# Patient Record
Sex: FEMALE | Race: WHITE | NOT HISPANIC OR LATINO
[De-identification: names, ages, dates, MRNs, and addresses within clinical notes are randomized per-mention and may not be internally consistent; named-entity substitution may affect disease eponyms.]

---

## 2021-08-04 PROBLEM — Z00.00 ENCOUNTER FOR PREVENTIVE HEALTH EXAMINATION: Status: ACTIVE | Noted: 2021-08-04

## 2021-08-09 ENCOUNTER — APPOINTMENT (OUTPATIENT)
Dept: THORACIC SURGERY | Facility: CLINIC | Age: 66
End: 2021-08-09
Payer: MEDICARE

## 2021-08-09 VITALS
HEART RATE: 87 BPM | DIASTOLIC BLOOD PRESSURE: 85 MMHG | RESPIRATION RATE: 18 BRPM | SYSTOLIC BLOOD PRESSURE: 140 MMHG | TEMPERATURE: 98.1 F | OXYGEN SATURATION: 97 %

## 2021-08-09 DIAGNOSIS — Z86.39 PERSONAL HISTORY OF OTHER ENDOCRINE, NUTRITIONAL AND METABOLIC DISEASE: ICD-10-CM

## 2021-08-09 DIAGNOSIS — Z85.51 PERSONAL HISTORY OF MALIGNANT NEOPLASM OF BLADDER: ICD-10-CM

## 2021-08-09 DIAGNOSIS — Z85.3 PERSONAL HISTORY OF MALIGNANT NEOPLASM OF BREAST: ICD-10-CM

## 2021-08-09 DIAGNOSIS — Z87.09 PERSONAL HISTORY OF OTHER DISEASES OF THE RESPIRATORY SYSTEM: ICD-10-CM

## 2021-08-09 DIAGNOSIS — Z87.891 PERSONAL HISTORY OF NICOTINE DEPENDENCE: ICD-10-CM

## 2021-08-09 PROCEDURE — 99203 OFFICE O/P NEW LOW 30 MIN: CPT

## 2021-08-09 RX ORDER — LEVOTHYROXINE SODIUM 88 UG/1
88 TABLET ORAL
Refills: 0 | Status: ACTIVE | COMMUNITY

## 2021-08-09 RX ORDER — MIRTAZAPINE 7.5 MG/1
TABLET, FILM COATED ORAL
Refills: 0 | Status: ACTIVE | COMMUNITY

## 2021-08-09 RX ORDER — UBIDECARENONE/VIT E ACET 100MG-5
CAPSULE ORAL
Refills: 0 | Status: ACTIVE | COMMUNITY

## 2021-08-09 RX ORDER — ALBUTEROL SULFATE 90 UG/1
108 (90 BASE) AEROSOL, METERED RESPIRATORY (INHALATION)
Refills: 0 | Status: ACTIVE | COMMUNITY

## 2021-08-09 RX ORDER — GABAPENTIN 100 MG/1
CAPSULE ORAL
Refills: 0 | Status: ACTIVE | COMMUNITY

## 2021-08-09 RX ORDER — FLUTICASONE PROPION/SALMETEROL 500-50 MCG
BLISTER, WITH INHALATION DEVICE INHALATION
Refills: 0 | Status: ACTIVE | COMMUNITY

## 2021-08-09 RX ORDER — FLUTICASONE PROPIONATE 50 MCG
50 SPRAY, SUSPENSION NASAL
Refills: 0 | Status: ACTIVE | COMMUNITY

## 2021-08-09 RX ORDER — DIPHENOXYLATE HYDROCHLORIDE AND ATROPINE SULFATE 2.5; .025 MG/1; MG/1
TABLET ORAL
Refills: 0 | Status: ACTIVE | COMMUNITY

## 2021-08-09 RX ORDER — DICYCLOMINE HYDROCHLORIDE 10 MG/1
10 CAPSULE ORAL
Refills: 0 | Status: ACTIVE | COMMUNITY

## 2021-08-09 RX ORDER — DEXTRIN 3 G/3.5 G
POWDER (GRAM) ORAL
Refills: 0 | Status: ACTIVE | COMMUNITY

## 2021-08-09 RX ORDER — FAMOTIDINE 10 MG/1
TABLET, FILM COATED ORAL
Refills: 0 | Status: ACTIVE | COMMUNITY

## 2021-08-09 RX ORDER — FOLIC ACID 20 MG
CAPSULE ORAL
Refills: 0 | Status: ACTIVE | COMMUNITY

## 2021-08-09 RX ORDER — ALPRAZOLAM 2 MG/1
TABLET ORAL
Refills: 0 | Status: ACTIVE | COMMUNITY

## 2021-08-24 NOTE — REASON FOR VISIT
[Initial Evaluation] : an initial evaluation [Family Member] : family member [FreeTextEntry1] : History of lung cancer new lung nodules

## 2021-08-24 NOTE — HISTORY OF PRESENT ILLNESS
[FreeTextEntry1] : Kristin is a 66-year-old female with a history of lung cancer and breast cancer in the past.  The patient has been followed by oncology on a regular basis.  The patient complains of severe pain.  At the patient had several CAT scans done including a CAT scan of the chest.  The CT scan of the chest showed a 5 mm nodule in the right upper lobe that was new as well as a for me millimeter nodule in the right upper lobe that was also new.  No evidence of any other disease was seen.  There was no lymphadenopathy.  The patient's main complaint is back pain.  She is here for evaluation.

## 2021-08-24 NOTE — ASSESSMENT
[FreeTextEntry1] : This patient is a 66-year-old female with a history of lung cancer in the past.  The patient has 2 new lung nodules in the right upper lobe.  These are new.  They are 5 mm and 4 mm respectively.  No other abnormalities.  There is no other evidence of malignancy.  No evidence of mediastinal or hilar adenopathy.  The patient's main complaint is back pain.  I had a long conversation with the patient.  I recommend a repeat CAT scan in 6months.  At this point the nodules are too small for PET scan or needle biopsy.  I would not consider surgery at this time.  The patient should get a repeat CAT scan in 6months, if there is any growth then we will consider more aggressive options at that time.

## 2022-01-07 DIAGNOSIS — R91.1 SOLITARY PULMONARY NODULE: ICD-10-CM

## 2022-02-28 ENCOUNTER — APPOINTMENT (OUTPATIENT)
Dept: THORACIC SURGERY | Facility: CLINIC | Age: 67
End: 2022-02-28
Payer: MEDICARE

## 2022-02-28 PROCEDURE — 99442: CPT | Mod: 95

## 2022-03-03 NOTE — ASSESSMENT
[FreeTextEntry1] : This patient is a 66-year-old female who has a history of lung nodules.  Most recently the patient had a 5 mm lung nodule in the right upper lobe.  This was being followed closely.  On the most recent CAT scan it has decreased in size.  There were no other concerning abnormalities on the CAT scan.  I had a long conversation with the patient.  She should get a repeat CAT scan in 1 year.  I do not believe she has any evidence of disease.  The patient now complains of mostly back pain.  The patient will repeat CAT scan and then follow-up with me in 1 year.

## 2022-03-03 NOTE — HISTORY OF PRESENT ILLNESS
[FreeTextEntry1] : Kristin is a 66-year-old female with a history of lung cancer and breast cancer in the past.  The patient has been followed by oncology on a regular basis.  The patient complains of severe pain.  At the patient had several CAT scans done including a CAT scan of the chest.  The CT scan of the chest showed a 5 mm nodule in the right upper lobe that was new as well as a for me millimeter nodule in the right upper lobe that was also new.  No evidence of any other disease was seen.  There was no lymphadenopathy.  The patient's main complaint is back pain.  She is here for evaluation.\par \par The patient had a repeat CAT scan on February 18, 2022 the CAT scan showed the nodule in the right upper lobe has decreased.  There is no other evidence of concerning findings.  The patient also had a history of a left lower lobectomy.  The patient is having a telehealth visit to review the CAT scan.

## 2023-02-23 ENCOUNTER — APPOINTMENT (OUTPATIENT)
Dept: THORACIC SURGERY | Facility: CLINIC | Age: 68
End: 2023-02-23

## 2023-03-23 ENCOUNTER — APPOINTMENT (OUTPATIENT)
Dept: THORACIC SURGERY | Facility: CLINIC | Age: 68
End: 2023-03-23
Payer: MEDICARE

## 2023-03-23 PROCEDURE — 99442: CPT

## 2023-03-30 NOTE — ASSESSMENT
[FreeTextEntry1] : This patient is a 68-year-old female who is status post lung surgery for lung mass.  The patient had a repeat CAT scan of the chest recently which shows no evidence of any respiratory disease.  The patient is otherwise doing well from a respiratory standpoint.  I recommend a repeat CAT scan in 1 year.  All questions were answered

## 2023-03-30 NOTE — HISTORY OF PRESENT ILLNESS
[FreeTextEntry1] : Kristin is a 66-year-old female with a history of lung cancer and breast cancer in the past.  The patient has been followed by oncology on a regular basis.  The patient complains of severe pain.  At the patient had several CAT scans done including a CAT scan of the chest.  The CT scan of the chest showed a 5 mm nodule in the right upper lobe that was new as well as a for me millimeter nodule in the right upper lobe that was also new.  No evidence of any other disease was seen.  There was no lymphadenopathy.  The patient's main complaint is back pain.  She is here for evaluation.\par \par The patient had a repeat CAT scan on February 18, 2022 the CAT scan showed the nodule in the right upper lobe has decreased.  There is no other evidence of concerning findings.  The patient also had a history of a left lower lobectomy.  The patient is having a telehealth visit to review the CAT scan.\par \par Her most recent CAT scan shows no evidence of any disease.  The patient is currently asymptomatic from a respiratory standpoint.

## 2024-02-26 ENCOUNTER — NON-APPOINTMENT (OUTPATIENT)
Age: 69
End: 2024-02-26

## 2024-11-03 ENCOUNTER — NON-APPOINTMENT (OUTPATIENT)
Age: 69
End: 2024-11-03